# Patient Record
(demographics unavailable — no encounter records)

---

## 2024-11-05 NOTE — HISTORY OF PRESENT ILLNESS
[Gradual] : gradual [8] : 8 [6] : 6 [Sharp] : sharp [Frequent] : frequent [Ice] : ice [de-identified] : This is Ms. LAINEY PARADA  a 41 year old female who comes in today complaining of right shoulder and elbow pain since January 2024.  saw outside PM in [place] and did 3 months of PT and CSI into shoulder and elbow with mild help. Pain mostly anterior. Injection gave relief for 3 days.. [] : no

## 2024-11-05 NOTE — DISCUSSION/SUMMARY
[de-identified] : Discussed options,  Given failure of 3 months of Physical Therapy and CSI inj to right shoulder and right elbow with relief for only limited period of time, has indicated for MRI Obtain JOSUÉ right shoulder ro rotator cuff tear, AC arthralgia Obtain MRI right elbow ro lateral epicondylitis  f/u p MRI ----------------------------------------------------------------------------   All relevant imaging studies pertinent to today's visit, including x-rays, MRI's and/or other advanced imaging studies (CT/etc) were independently interpreted and reviewed with the patient as needed. Implications of the studies together with the patient's clinical picture were discussed to formulate a working diagnosis and management options were detailed.   The patient and/or guardian was advised of the diagnosis.  The natural history of the pathology was explained in full. All questions were answered.  The risks and benefits of conservative and interventional treatment alternatives were explained to the patient   The patient and/or guardian was advised if any advanced diagnostic/imaging study (MRI/CT/etc) is ordered to evaluate potential pathology in the affected area(s), they should follow up in the office to review the results of the study and determine further management that may be indicated.

## 2024-11-05 NOTE — IMAGING
Spontaneous, unlabored and symmetrical
[de-identified] :   ----------------------------------------------------------------------------   Right shoulder exam:   Skin: no significant pertinent finding Inspection: no obvious deformity, no obvious masses, no swelling, no effusion, no atrophy ROM:    FF: 155    ER: 55    IR: L5 Tenderness:    (+) Anterior/Biceps:    (neg) Posterior    (neg) Lateral    (neg) Trapezius    (neg) Scapula    (+) AC joint    (neg) Crepitus with ROM Stability:    (neg) Translation    (neg) Apprehension    (neg) Clicking Additional tests:    (+) Neer's    (+)Hawkin's    (neg) Sharma's    (neg) Speed    (neg) Cross chest adduction Strength: + pain    FF: 5/5    ER: 5/5    IR: 5/5    Biceps: 5/5    Triceps: 5/5    Distal: 5/5 Neuro: In tact to light touch throughout Vascularity: Extremity warm and well perfused     ----------------------------------------------------------------------------     Right elbow exam:    Inspection:    (neg) Carrying angle deformity    (neg) Swelling    (neg) Olecranon bursa    (neg) Brendon ROM:   Flexion: 150    Extention: 0   Supination: 90      Pronation: 90 Tenderness:    (+) Lateral epicondyle               (+) Medial epicondyle    (neg) LUCL                                   (neg) UCL    (neg) Radial head    (neg) Olecranon    (neg) Mid forearm    (neg) Radial tunnel      (neg) Biceps tendon / muscle        (neg) Palpable defect    (neg) Triceps tendon / muscle       (neg) Palpable defect Additional tests:    (neg) Pain with varus/valgus stress    (neg) Opening to varus/valgus stress    (neg) Milking test    (neg) Tinel's cubital tunnel              (neg) Subluxing ulnar nerve    (+) Resisted wrist extension in elbow extension    (+) Resisted wrist flexion in elbow extension Strength: 5/5 Flexion/Extension/Pronation/Supination Neuro: In tact to light touch throughout all distributions distally Vascularity: Extremity warm and well perfused

## 2025-04-14 NOTE — IMAGING
[de-identified] : RIGHT SHOULDER TTP diffusely to shoulder. FF: 90, ER 30, IR to back pocket. + pain with all shoulder ROM. shoulder abduction 4-/5, forward flexion 4-/5, external rotation 4-/5, internal rotation 4-/5. Sensation intact to light touch. + Hawkin's, Neer impingement test.   RIGHT ELBOW skin intact.  TTP diffusely to elbow - worse to lateral epicondyle. elbow ROM: good extension, flexion. good pronation, supination. wrist ROM: good extension, flexion. good digital extension, flex to full fist. sensation intact to light touch. palpable radial pulse. + pain with resisted wrist extension.  @11/5/24 XRAYS OF RIGHT SHOULDER (3 views - AP, LATERAL, AND AXILLARY VIEWS): no acute displaced fracture or dislocation. @11/5/24 XRAYS OF RIGHT ELBOW (3 views - PA, OBLIQUE, AND LATERAL VIEWS): no acute displaced fracture or dislocation.

## 2025-04-14 NOTE — ASSESSMENT
[FreeTextEntry1] : The condition was explained to the patient.  Discussed that the natural history of epicondylitis is self-limited and most cases resolve by 1 year. Recommend symptomatic treatment in the interim - activity modification, ice, NSAID, brace, PT, steroid vs PRP injection.  - prescribed PT for R shoulder and elbow. - pain guided activity modification.  F/u with Dr Hernández.

## 2025-04-14 NOTE — HISTORY OF PRESENT ILLNESS
[de-identified] : 4/14/25: 43yo female (RHD. ) presents for RIGHT shoulder pain and RIGHT elbow pain x 1 year. Saw an outside Pain Management physician => Attended PT from April to August 2024, CSI in June 2024. Reports no improvement with PT, 2 weeks of relief with CSI. Saw Dr Hernández for this on 11/5/24 => ordered MRI (not done). + Tylenol, Advil.  Hx: none. [FreeTextEntry5] : pt here for right shoulder and elbow. Saw Dr Hernández in Nov for this. Has Xrs in Pac[facility]. States she was supposed to get an mri but the script . Pain level is at a 10.

## 2025-04-17 NOTE — REASON FOR VISIT
[FreeTextEntry2] : follow up right shoulder/elbow. Has been doing PT/ HEP and taking diclofenac for the shoulder and elbow since last visit with no relief. Pain persists and is limiting ADLs. Switched to HEP because PT was making her pain worse.

## 2025-04-17 NOTE — DISCUSSION/SUMMARY
[de-identified] : Discussed options,  Given failure of 3 months of Physical Therapy/ HEP and CSI inj to right shoulder and right elbow with relief for only limited period of time, has indicated for MRI Obtain MRI right shoulder eval rotator cuff tear Obtain MRI right elbow eval tear f/u p MRI  ----------------------------------------------------------------------------   All relevant imaging studies pertinent to today's visit, including x-rays, MRI's and/or other advanced imaging studies (CT/etc) were independently interpreted and reviewed with the patient as needed. Implications of the studies together with the patient's clinical picture were discussed to formulate a working diagnosis and management options were detailed.   The patient and/or guardian was advised of the diagnosis.  The natural history of the pathology was explained in full. All questions were answered.  The risks and benefits of conservative and interventional treatment alternatives were explained to the patient   The patient and/or guardian was advised if any advanced diagnostic/imaging study (MRI/CT/etc) is ordered to evaluate potential pathology in the affected area(s), they should follow up in the office to review the results of the study and determine further management that may be indicated.

## 2025-04-17 NOTE — HISTORY OF PRESENT ILLNESS
[Gradual] : gradual [6] : 6 [Sharp] : sharp [Frequent] : frequent [Ice] : ice [de-identified] : This is Ms. LAINEY PARADA  a 41 year old female who comes in today complaining of right shoulder and elbow pain since January 2024.  saw outside PM in [place] and did 3 months of PT and CSI into shoulder and elbow with mild help. Pain mostly anterior. Injection gave relief for 3 days.. [] : no [de-identified] : none

## 2025-04-17 NOTE — IMAGING
[de-identified] :   ----------------------------------------------------------------------------   Right shoulder exam:   Skin: no significant pertinent finding Inspection: no obvious deformity, no obvious masses, no swelling, no effusion, no atrophy ROM:    FF: 140    ER: 35    IR: lat hip Tenderness:    (+) Anterior/Biceps:    (neg) Posterior    (neg) Lateral    (neg) Trapezius    (neg) Scapula    (+) AC joint    (neg) Crepitus with ROM Stability:    (neg) Translation    (neg) Apprehension    (neg) Clicking Additional tests:    (+) Neer's    (+)Hawkin's    (neg) Sharma's    (neg) Speed    (neg) Cross chest adduction Strength: + pain    FF: 5/5    ER: 5/5    IR: 5/5    Biceps: 5/5    Triceps: 5/5    Distal: 5/5 Neuro: In tact to light touch throughout Vascularity: Extremity warm and well perfused     ----------------------------------------------------------------------------     Right elbow exam:    Inspection:    (neg) Carrying angle deformity    (neg) Swelling    (neg) Olecranon bursa    (neg) Brendon ROM:   Flexion: 150    Extention: 0   Supination: 90      Pronation: 90 Tenderness:    (++) Lateral epicondyle               (+) Medial epicondyle    (neg) LUCL                                   (neg) UCL    (neg) Radial head    (neg) Olecranon    (neg) Mid forearm    (neg) Radial tunnel      (neg) Biceps tendon / muscle        (neg) Palpable defect    (neg) Triceps tendon / muscle       (neg) Palpable defect Additional tests:    (neg) Pain with varus/valgus stress    (neg) Opening to varus/valgus stress    (neg) Milking test    (neg) Tinel's cubital tunnel              (neg) Subluxing ulnar nerve    (+) Resisted wrist extension in elbow extension    (+) Resisted wrist flexion in elbow extension Strength: 5/5 Flexion/Extension/Pronation/Supination Neuro: In tact to light touch throughout all distributions distally Vascularity: Extremity warm and well perfused      [AC Joint Arthrosis] : AC Joint Arthrosis [Type 1 acromion] : Type 1 acromion [Type 2 acromion] : Type 2 acromion [Right] : right elbow [There are no fractures, subluxations or dislocations. No significant abnormalities are seen] : There are no fractures, subluxations or dislocations. No significant abnormalities are seen

## 2025-04-17 NOTE — DISCUSSION/SUMMARY
[de-identified] : Discussed options,  Given failure of 3 months of Physical Therapy/ HEP and CSI inj to right shoulder and right elbow with relief for only limited period of time, has indicated for MRI Obtain MRI right shoulder eval rotator cuff tear Obtain MRI right elbow eval tear f/u p MRI  ----------------------------------------------------------------------------   All relevant imaging studies pertinent to today's visit, including x-rays, MRI's and/or other advanced imaging studies (CT/etc) were independently interpreted and reviewed with the patient as needed. Implications of the studies together with the patient's clinical picture were discussed to formulate a working diagnosis and management options were detailed.   The patient and/or guardian was advised of the diagnosis.  The natural history of the pathology was explained in full. All questions were answered.  The risks and benefits of conservative and interventional treatment alternatives were explained to the patient   The patient and/or guardian was advised if any advanced diagnostic/imaging study (MRI/CT/etc) is ordered to evaluate potential pathology in the affected area(s), they should follow up in the office to review the results of the study and determine further management that may be indicated.

## 2025-04-17 NOTE — IMAGING
[de-identified] :   ----------------------------------------------------------------------------   Right shoulder exam:   Skin: no significant pertinent finding Inspection: no obvious deformity, no obvious masses, no swelling, no effusion, no atrophy ROM:    FF: 140    ER: 35    IR: lat hip Tenderness:    (+) Anterior/Biceps:    (neg) Posterior    (neg) Lateral    (neg) Trapezius    (neg) Scapula    (+) AC joint    (neg) Crepitus with ROM Stability:    (neg) Translation    (neg) Apprehension    (neg) Clicking Additional tests:    (+) Neer's    (+)Hawkin's    (neg) Sharma's    (neg) Speed    (neg) Cross chest adduction Strength: + pain    FF: 5/5    ER: 5/5    IR: 5/5    Biceps: 5/5    Triceps: 5/5    Distal: 5/5 Neuro: In tact to light touch throughout Vascularity: Extremity warm and well perfused     ----------------------------------------------------------------------------     Right elbow exam:    Inspection:    (neg) Carrying angle deformity    (neg) Swelling    (neg) Olecranon bursa    (neg) Brendon ROM:   Flexion: 150    Extention: 0   Supination: 90      Pronation: 90 Tenderness:    (++) Lateral epicondyle               (+) Medial epicondyle    (neg) LUCL                                   (neg) UCL    (neg) Radial head    (neg) Olecranon    (neg) Mid forearm    (neg) Radial tunnel      (neg) Biceps tendon / muscle        (neg) Palpable defect    (neg) Triceps tendon / muscle       (neg) Palpable defect Additional tests:    (neg) Pain with varus/valgus stress    (neg) Opening to varus/valgus stress    (neg) Milking test    (neg) Tinel's cubital tunnel              (neg) Subluxing ulnar nerve    (+) Resisted wrist extension in elbow extension    (+) Resisted wrist flexion in elbow extension Strength: 5/5 Flexion/Extension/Pronation/Supination Neuro: In tact to light touch throughout all distributions distally Vascularity: Extremity warm and well perfused      [AC Joint Arthrosis] : AC Joint Arthrosis [Type 1 acromion] : Type 1 acromion [Type 2 acromion] : Type 2 acromion [Right] : right elbow [There are no fractures, subluxations or dislocations. No significant abnormalities are seen] : There are no fractures, subluxations or dislocations. No significant abnormalities are seen

## 2025-04-17 NOTE — HISTORY OF PRESENT ILLNESS
[Gradual] : gradual [6] : 6 [Sharp] : sharp [Frequent] : frequent [Ice] : ice [de-identified] : This is Ms. LAINEY PARADA  a 41 year old female who comes in today complaining of right shoulder and elbow pain since January 2024.  saw outside PM in [place] and did 3 months of PT and CSI into shoulder and elbow with mild help. Pain mostly anterior. Injection gave relief for 3 days.. [] : no [de-identified] : none

## 2025-05-12 NOTE — HISTORY OF PRESENT ILLNESS
[Gradual] : gradual [7] : 7 [Sharp] : sharp [Frequent] : frequent [Ice] : ice [de-identified] : This is Ms. LAINEY PARADA  a 41 year old female who comes in today complaining of right shoulder and elbow pain since January 2024.  saw outside PM in [place] and did 3 months of PT and CSI into shoulder and elbow with mild help. Pain mostly anterior. Injection gave relief for 3 days.. [] : no [de-identified] : MRI

## 2025-05-12 NOTE — DISCUSSION/SUMMARY
[de-identified] : MRI reviewed, Consulted with Dr Hernández regarding imaging discussed conservative vs surgical management  IAC today R shoulder - good lido response continue PT - new Rx fu 6 wks  ----------------------------------------------------------------------------   All relevant imaging studies pertinent to today's visit, including x-rays, MRI's and/or other advanced imaging studies (CT/etc) were independently interpreted and reviewed with the patient as needed. Implications of the studies together with the patient's clinical picture were discussed to formulate a working diagnosis and management options were detailed.   The patient and/or guardian was advised of the diagnosis.  The natural history of the pathology was explained in full. All questions were answered.  The risks and benefits of conservative and interventional treatment alternatives were explained to the patient   The patient and/or guardian was advised if any advanced diagnostic/imaging study (MRI/CT/etc) is ordered to evaluate potential pathology in the affected area(s), they should follow up in the office to review the results of the study and determine further management that may be indicated.   Large joint corticosteroid injection given: Right shoulder intraarticular  Patient indicated for injection after trial of rest, OTC medications including aspirin, Ibuprofen, Aleve etc or prescription NSAIDS, and/or exercises at home and/ or physical therapy without satisfactory response. Patient has symptoms including pain, swelling, and/or decreased mobility in the joint. The risks, benefits, and alternatives to corticosteroid injection were explained in full to the patient, including but not limited to infection, sepsis, bleeding, scarring, skin discoloration, temporary increase in pain, syncopal episode, failure to resolve symptoms, allergic reaction, symptom recurrence, and elevation of blood sugar in diabetics. Patient understood the risks. All questions were answered. After discussion of options, patient requested an injection.   Oral informed consent was obtained and sterile technique was utilized for the procedure including the preparation of the solutions used for the injection and betadine followed by alcohol prep to the injection site. Anesthesia was given with ethyl chloride sprayed topically. The injection was delivered. Patient tolerated the procedure well.   Post Procedure Instructions: Patient was advised to call if redness, pain, or fever occur and apply ice for 15 min on and 15 min off later today  Medications delivered: Kenalog 10 mg, Lidocaine: 4 cc per joint

## 2025-05-12 NOTE — IMAGING
[AC Joint Arthrosis] : AC Joint Arthrosis [Type 1 acromion] : Type 1 acromion [Type 2 acromion] : Type 2 acromion [Right] : right elbow [There are no fractures, subluxations or dislocations. No significant abnormalities are seen] : There are no fractures, subluxations or dislocations. No significant abnormalities are seen [de-identified] :   ----------------------------------------------------------------------------   Right shoulder exam:   Skin: no significant pertinent finding Inspection: no obvious deformity, no obvious masses, no swelling, no effusion, no atrophy ROM:    FF: 140    ER: 35    IR: lat hip Tenderness:    (+) Anterior/Biceps:    (neg) Posterior    (neg) Lateral    (neg) Trapezius    (neg) Scapula    (+) AC joint    (neg) Crepitus with ROM Stability:    (neg) Translation    (neg) Apprehension    (neg) Clicking Additional tests:    (+) Neer's    (+)Hawkin's    (neg) Sharma's    (neg) Speed    (neg) Cross chest adduction Strength: + pain    FF: 5/5    ER: 5/5    IR: 5/5    Biceps: 5/5    Triceps: 5/5    Distal: 5/5 Neuro: In tact to light touch throughout Vascularity: Extremity warm and well perfused     ----------------------------------------------------------------------------     Right elbow exam:    Inspection:    (neg) Carrying angle deformity    (neg) Swelling    (neg) Olecranon bursa    (neg) Brendon ROM:   Flexion: 150    Extention: 0   Supination: 90      Pronation: 90 Tenderness:    (++) Lateral epicondyle               (+) Medial epicondyle    (neg) LUCL                                   (neg) UCL    (neg) Radial head    (neg) Olecranon    (neg) Mid forearm    (neg) Radial tunnel      (neg) Biceps tendon / muscle        (neg) Palpable defect    (neg) Triceps tendon / muscle       (neg) Palpable defect Additional tests:    (neg) Pain with varus/valgus stress    (neg) Opening to varus/valgus stress    (neg) Milking test    (neg) Tinel's cubital tunnel              (neg) Subluxing ulnar nerve    (+) Resisted wrist extension in elbow extension    (+) Resisted wrist flexion in elbow extension Strength: 5/5 Flexion/Extension/Pronation/Supination Neuro: In tact to light touch throughout all distributions distally Vascularity: Extremity warm and well perfused

## 2025-05-12 NOTE — DATA REVIEWED
[Shoulder] : shoulder [MRI] : MRI [Right] : of the right [Elbow] : elbow [Report was reviewed and noted in the chart] : The report was reviewed and noted in the chart [FreeTextEntry1] : adhesive capsulitis, subacromial bursitis/ rtc tendonitis, mild AC arthrosis w subacromial spur [FreeTextEntry2] : mild lateral epicondylitis, partial tearing extensor tendon origin

## 2025-05-12 NOTE — HISTORY OF PRESENT ILLNESS
[Gradual] : gradual [7] : 7 [Sharp] : sharp [Frequent] : frequent [Ice] : ice [de-identified] : This is Ms. LAINEY PARADA  a 41 year old female who comes in today complaining of right shoulder and elbow pain since January 2024.  saw outside PM in [place] and did 3 months of PT and CSI into shoulder and elbow with mild help. Pain mostly anterior. Injection gave relief for 3 days.. [] : no [de-identified] : MRI

## 2025-05-12 NOTE — DISCUSSION/SUMMARY
[de-identified] : MRI reviewed, Consulted with Dr Hernández regarding imaging discussed conservative vs surgical management  IAC today R shoulder - good lido response continue PT - new Rx fu 6 wks  ----------------------------------------------------------------------------   All relevant imaging studies pertinent to today's visit, including x-rays, MRI's and/or other advanced imaging studies (CT/etc) were independently interpreted and reviewed with the patient as needed. Implications of the studies together with the patient's clinical picture were discussed to formulate a working diagnosis and management options were detailed.   The patient and/or guardian was advised of the diagnosis.  The natural history of the pathology was explained in full. All questions were answered.  The risks and benefits of conservative and interventional treatment alternatives were explained to the patient   The patient and/or guardian was advised if any advanced diagnostic/imaging study (MRI/CT/etc) is ordered to evaluate potential pathology in the affected area(s), they should follow up in the office to review the results of the study and determine further management that may be indicated.   Large joint corticosteroid injection given: Right shoulder intraarticular  Patient indicated for injection after trial of rest, OTC medications including aspirin, Ibuprofen, Aleve etc or prescription NSAIDS, and/or exercises at home and/ or physical therapy without satisfactory response. Patient has symptoms including pain, swelling, and/or decreased mobility in the joint. The risks, benefits, and alternatives to corticosteroid injection were explained in full to the patient, including but not limited to infection, sepsis, bleeding, scarring, skin discoloration, temporary increase in pain, syncopal episode, failure to resolve symptoms, allergic reaction, symptom recurrence, and elevation of blood sugar in diabetics. Patient understood the risks. All questions were answered. After discussion of options, patient requested an injection.   Oral informed consent was obtained and sterile technique was utilized for the procedure including the preparation of the solutions used for the injection and betadine followed by alcohol prep to the injection site. Anesthesia was given with ethyl chloride sprayed topically. The injection was delivered. Patient tolerated the procedure well.   Post Procedure Instructions: Patient was advised to call if redness, pain, or fever occur and apply ice for 15 min on and 15 min off later today  Medications delivered: Kenalog 10 mg, Lidocaine: 4 cc per joint

## 2025-05-12 NOTE — IMAGING
[AC Joint Arthrosis] : AC Joint Arthrosis [Type 1 acromion] : Type 1 acromion [Type 2 acromion] : Type 2 acromion [Right] : right elbow [There are no fractures, subluxations or dislocations. No significant abnormalities are seen] : There are no fractures, subluxations or dislocations. No significant abnormalities are seen [de-identified] :   ----------------------------------------------------------------------------   Right shoulder exam:   Skin: no significant pertinent finding Inspection: no obvious deformity, no obvious masses, no swelling, no effusion, no atrophy ROM:    FF: 140    ER: 35    IR: lat hip Tenderness:    (+) Anterior/Biceps:    (neg) Posterior    (neg) Lateral    (neg) Trapezius    (neg) Scapula    (+) AC joint    (neg) Crepitus with ROM Stability:    (neg) Translation    (neg) Apprehension    (neg) Clicking Additional tests:    (+) Neer's    (+)Hawkin's    (neg) Sharma's    (neg) Speed    (neg) Cross chest adduction Strength: + pain    FF: 5/5    ER: 5/5    IR: 5/5    Biceps: 5/5    Triceps: 5/5    Distal: 5/5 Neuro: In tact to light touch throughout Vascularity: Extremity warm and well perfused     ----------------------------------------------------------------------------     Right elbow exam:    Inspection:    (neg) Carrying angle deformity    (neg) Swelling    (neg) Olecranon bursa    (neg) Brendon ROM:   Flexion: 150    Extention: 0   Supination: 90      Pronation: 90 Tenderness:    (++) Lateral epicondyle               (+) Medial epicondyle    (neg) LUCL                                   (neg) UCL    (neg) Radial head    (neg) Olecranon    (neg) Mid forearm    (neg) Radial tunnel      (neg) Biceps tendon / muscle        (neg) Palpable defect    (neg) Triceps tendon / muscle       (neg) Palpable defect Additional tests:    (neg) Pain with varus/valgus stress    (neg) Opening to varus/valgus stress    (neg) Milking test    (neg) Tinel's cubital tunnel              (neg) Subluxing ulnar nerve    (+) Resisted wrist extension in elbow extension    (+) Resisted wrist flexion in elbow extension Strength: 5/5 Flexion/Extension/Pronation/Supination Neuro: In tact to light touch throughout all distributions distally Vascularity: Extremity warm and well perfused